# Patient Record
Sex: MALE | Race: WHITE | NOT HISPANIC OR LATINO | URBAN - METROPOLITAN AREA
[De-identification: names, ages, dates, MRNs, and addresses within clinical notes are randomized per-mention and may not be internally consistent; named-entity substitution may affect disease eponyms.]

---

## 2024-09-25 ENCOUNTER — APPOINTMENT (OUTPATIENT)
Dept: DERMATOLOGY | Facility: CLINIC | Age: 69
End: 2024-09-25
Payer: COMMERCIAL

## 2024-09-25 DIAGNOSIS — D48.5 NEOPLASM OF UNCERTAIN BEHAVIOR OF SKIN: Primary | ICD-10-CM

## 2024-09-25 DIAGNOSIS — L90.5 SCAR CONDITIONS AND FIBROSIS OF SKIN: ICD-10-CM

## 2024-09-25 PROCEDURE — 99203 OFFICE O/P NEW LOW 30 MIN: CPT | Performed by: DERMATOLOGY

## 2024-09-25 RX ORDER — LOSARTAN POTASSIUM 100 MG/1
100 TABLET ORAL
COMMUNITY
Start: 2024-02-23

## 2024-09-25 RX ORDER — ROSUVASTATIN CALCIUM 20 MG/1
1 TABLET, COATED ORAL NIGHTLY
COMMUNITY
Start: 2024-01-16

## 2024-09-25 RX ORDER — TAMSULOSIN HYDROCHLORIDE 0.4 MG/1
2 CAPSULE ORAL NIGHTLY
COMMUNITY
Start: 2024-02-23

## 2024-09-25 NOTE — PROGRESS NOTES
Subjective   Derek Palafox is a 68 y.o. male who presents for the following: Suspicious Skin Lesion (Right upper lip x2/Feels like an indent/Present x years/Picked the skin as a kid/MM left wrist 2017 ).    Skin Lesion  He describes it as a growth, that is located on his  right upper lip .  It was first noticed several years ago. It has been causing no skin symptoms and is not changing. Previously this spot has been  irritating .  Other spots that are concerning: none    Family History of Skin Cancer(s):  Mother  Personal History of Skin Cancer(s):  MM left wrist 2017  Family History of Skin Disorder(s):  none  Personal History of Skin Disorder(s):  none     Objective   Well appearing patient in no apparent distress; mood and affect are within normal limits.    A focused examination was performed including lips and melanoma scar on L wrist. All findings within normal limits unless otherwise noted below.    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 Ddx includes prurigo nodules vs traumatized SK vs other traumatized benign lesion. Risks, benefits, side effects, alternatives and options were discussed with patient and the patient voiced understanding. Patient agrees to LN2 treatment of lesions in clinic today and is aware multiple treatments may be needed.    LN2 x 3 cycles applied to lesions x 2; wound care d/w pt and pt expressed understanding. Pt to call if not improving; can consider biopsy vs other. Pt agrees with plan as above.     Also recommended pt get FBSE given hx of melanoma at some point; pt to schedule.         Scar conditions and fibrosis of  skin  Left Wrist - Anterior    Recommend FBSE as above.     Follow up as above.

## 2025-07-26 ENCOUNTER — HOSPITAL ENCOUNTER (OUTPATIENT)
Dept: RADIOLOGY | Facility: CLINIC | Age: 70
Discharge: HOME | End: 2025-07-26
Payer: COMMERCIAL

## 2025-07-26 DIAGNOSIS — M17.0 BILATERAL PRIMARY OSTEOARTHRITIS OF KNEE: ICD-10-CM

## 2025-07-26 PROCEDURE — 73564 X-RAY EXAM KNEE 4 OR MORE: CPT | Mod: BILATERAL PROCEDURE | Performed by: RADIOLOGY

## 2025-07-26 PROCEDURE — 73564 X-RAY EXAM KNEE 4 OR MORE: CPT | Mod: 50
